# Patient Record
Sex: FEMALE | Race: BLACK OR AFRICAN AMERICAN | NOT HISPANIC OR LATINO | Employment: FULL TIME | URBAN - METROPOLITAN AREA
[De-identification: names, ages, dates, MRNs, and addresses within clinical notes are randomized per-mention and may not be internally consistent; named-entity substitution may affect disease eponyms.]

---

## 2018-08-31 ENCOUNTER — OFFICE VISIT (OUTPATIENT)
Dept: RHEUMATOLOGY | Facility: CLINIC | Age: 43
End: 2018-08-31
Payer: COMMERCIAL

## 2018-08-31 VITALS
DIASTOLIC BLOOD PRESSURE: 90 MMHG | BODY MASS INDEX: 39.4 KG/M2 | WEIGHT: 230.81 LBS | HEART RATE: 94 BPM | HEIGHT: 64 IN | SYSTOLIC BLOOD PRESSURE: 147 MMHG

## 2018-08-31 DIAGNOSIS — G89.29 ELBOW PAIN, CHRONIC, LEFT: Primary | ICD-10-CM

## 2018-08-31 DIAGNOSIS — M25.522 ELBOW PAIN, CHRONIC, LEFT: Primary | ICD-10-CM

## 2018-08-31 PROCEDURE — 99203 OFFICE O/P NEW LOW 30 MIN: CPT | Mod: S$GLB,,, | Performed by: INTERNAL MEDICINE

## 2018-08-31 PROCEDURE — 3008F BODY MASS INDEX DOCD: CPT | Mod: CPTII,S$GLB,, | Performed by: INTERNAL MEDICINE

## 2018-08-31 PROCEDURE — 99999 PR PBB SHADOW E&M-EST. PATIENT-LVL III: CPT | Mod: PBBFAC,,, | Performed by: INTERNAL MEDICINE

## 2018-08-31 RX ORDER — ATORVASTATIN CALCIUM 20 MG/1
20 TABLET, FILM COATED ORAL DAILY
COMMUNITY

## 2018-08-31 RX ORDER — LISINOPRIL 20 MG/1
20 TABLET ORAL DAILY
COMMUNITY

## 2018-08-31 RX ORDER — HYDROCHLOROTHIAZIDE 25 MG/1
25 TABLET ORAL DAILY
COMMUNITY

## 2018-08-31 NOTE — LETTER
August 31, 2018      [facility]  [address] [place] [address]  Phone: [phone]  Fax: [phone]       Patient: Deja Rg   YOB: 1975  Date of Visit: 08/31/2018    To Whom It May Concern:    Candelaria Rg  was at [facility] on 08/31/2018. She may return to work/school on 08/31/2018. If you have any questions or concerns, or if I can be of further assistance, please do not hesitate to contact me.        Sincerely,        Jovanni Reaves LPN

## 2018-08-31 NOTE — LETTER
August 31, 2018      Isauro Luevano MD  [address]  [address]  [place] [address]           [facility]  [address] [facility] [address]  Phone: [phone]  Fax: [phone]          Patient: Deja Rg   MR Number: [id number]   YOB: 1975   Date of Visit: 8/31/2018       Dear Dr. Isauro Luevano:    Thank you for referring Deja Rg to me for evaluation. Attached you will find relevant portions of my assessment and plan of care.    If you have questions, please do not hesitate to call me. I look forward to following Deja Rg along with you.    Sincerely,    Janes Lemos MD    Enclosure  CC:  No Recipients    If you would like to receive this communication electronically, please contact [email or web address] or [phone] to request more information on [employer] access.    For providers and/or their staff who would like to refer a patient to [facility], please contact us through our one-stop-shop provider referral line, [facility] , at [phone].    If you feel you have received this communication in error or would no longer like to receive these types of communications, please e-mail [email or web address]

## 2018-08-31 NOTE — PROGRESS NOTES
RHEUMATOLOGY OUTPATIENT CLINIC NOTE    8/31/2018    Attending Rheumatologist: Janes Lemos  Primary Care Provider: Mary Philip MD   Physician Requesting Consultation: Isauro Luveano MD  [address]  [address]  [place] [address]  Chief Complaint/Reason For Consultation:  Arthritis      Subjective:       HPI  Deja Rg is a 43 y.o. Black or  female with history of chronic left elbow pain,  status post trauma and ORIF complicated by osteomyelitis; comes here for evaluation.  Patient fell from a ladder 1/2017 which resulted and proximal ready had fracture.  Had ORIF done but pain was unbearable doing the recovery / therapy period.  Was recommended for surgical right intervention, however was noted to have osteomyelitis during the procedure.  Was treated for approximately 3 months.  Was followed by outside ID specialist, no evidence of persistent osteomyelitis noted.  Patient continues to complain having pain and swelling.  Following closely with Ortho, per patient was recommended for surgical repair.  On workup, patient is noted to have persistent elevation of increased acute phase reactants.    Today:  Patient main complaint is chronic left elbow pain.  Pain is constant, aggravated by activity and tender to touch.  Wakes up in the evening sometimes, alleviated by meloxicam which she uses on a regular basis.  Refers association with swelling.  She denies having any other significant joint pain, arthritis, prolonged morning stiffness.  Does not have any fever, rash, photosensitivity,  or GI complaints.    Review of Systems   Constitutional: Negative for fever and weight loss.   HENT:        Denies eye or mouth sicca symptoms, denies oral ulcers, denies parotid swelling, denies swollen glands.   Eyes: Negative for blurred vision, pain and redness.   Respiratory: Negative for cough, hemoptysis, sputum production and shortness of breath.    Cardiovascular: Negative for chest  pain, orthopnea, leg swelling and PND.   Gastrointestinal: Negative for abdominal pain, blood in stool, constipation, diarrhea and heartburn.        Denies dysphagia.   Genitourinary: Negative for dysuria and hematuria.        Denies genital ulcers or sicca symptoms, denies foaming of the urine, denies nephrolithiasis.   Musculoskeletal: Negative for back pain, joint pain, myalgias and neck pain.   Skin: Negative for rash.        Denies photosensitivity, denies alopecia, denies sclerodactyly, denies Raynaud's phenomenon,denies digital ulcers, no psoriasis, ulcerations, no purpura, denies nodules.   Neurological: Positive for tingling. Negative for sensory change, focal weakness, seizures, weakness and headaches.        Denies transient ischemic attack, denies strokes, denies seizures   Endo/Heme/Allergies: Does not bruise/bleed easily.        + ectopic pregnancy, no other fetal loss.  Denies frequent infections, denies deep vein thrombosis or pulmonary embolism.   Psychiatric/Behavioral: Negative for substance abuse.   All other systems reviewed and are negative.    Past Medical History:   Diagnosis Date    Diabetes mellitus 02/2013    insulin resistance     Thyroid condition      History reviewed. No pertinent surgical history.  Family History   Problem Relation Age of Onset    Diabetes Father     Cataracts Father      Social History     Substance and Sexual Activity   Alcohol Use Yes     Social History     Tobacco Use   Smoking Status Never Smoker     Social History     Substance and Sexual Activity   Drug Use Not on file       Current Outpatient Medications:     atorvastatin (LIPITOR) 20 MG tablet, Take 20 mg by mouth once daily., Disp: , Rfl:     hydroCHLOROthiazide (HYDRODIURIL) 25 MG tablet, Take 25 mg by mouth once daily., Disp: , Rfl:     lisinopril (PRINIVIL,ZESTRIL) 20 MG tablet, Take 20 mg by mouth once daily., Disp: , Rfl:        Objective:         Vitals:    08/31/18 1131   BP: (!) 147/90    Pulse: 94     Physical Exam   Nursing note and vitals reviewed.  Constitutional: She is oriented to person, place, and time and well-developed, well-nourished, and in no distress.   Morbid obese.   HENT:   Head: Normocephalic.   Eyes: Conjunctivae are normal.   Absent Episcleritis/scleritis.   Neck: Normal range of motion.   Cardiovascular: Normal rate.    Pulmonary/Chest: Effort normal. No respiratory distress.   Abdominal: She exhibits no distension.   Neurological: She is alert and oriented to person, place, and time.   absent sensory deficits  muscle strength 5/5 through.   + Tinnel's on left elbow.   Skin: No rash noted.     No Skin fibrosis, teleangiectasias, rashes, discoid lesions, purpura, skin ulcers, nodules, or livedo reticularis, nail pitting.   Psychiatric: Mood and affect normal.   Musculoskeletal: She exhibits edema (Left elbow.) and tenderness (Left elbow.  TTP left bicipital tendon.). She exhibits no deformity.   : strong    AROM: Slight decreased range of motion on extension 15% on left elbow, otherwise intact  PROM: intact    Devices used by patient: none       Reviewed old and all outside pertinent medical records available.    All lab results personally reviewed and interpreted by me.  Lab Results   Component Value Date    WBC 5.84 10/27/2006    HGB 9.4 (L) 10/27/2006    HCT 30.2 (L) 10/27/2006    MCV 82.5 10/27/2006    MCH 25.7 (L) 10/27/2006    MCHC 31.1 (L) 10/27/2006    RDW 15.3 (H) 10/27/2006     (H) 10/27/2006    MPV 10.2 10/27/2006       No results found for: NA, K, CL, CO2, GLU, BUN, LABCREA, CALCIUM, PROT, ALBUMIN, BILITOT, AST, ALKPHOS, ALT, GFRAA, GFRNONAA    No results found for: COLORU, APPEARANCEUA, SPECGRAV, PHUR, PROTEINUA, GLUCOSEU, KETONESU, BLOODU, LEUKOCYTESUR, NITRITE, UROBILINOGEN    No results found for: CRP    No results found for: SEDRATE, ERYTHROCYTES    No results found for: PARVEZ, RF, SEDRATE    No components found for: 25OHVITDTOT, 97ATFINR9, 46KZWVHF6,  METHODNOTE    No results found for: URICACID    No components found for: TSPOTTB    Rheum Labs:  PARVEZ negative  Rheumatoid factor negative   (Ref. 192)   ESR: 35  CRP: 15.5    Imaging:  All imaging reviewed and independently  interpreted by me.    X-ray lower spine  No acute abnormalities    X-ray thoracic spine  Slight scoliosis otherwise, no acute abnormalities.    X-ray left elbow  Arthritic changes.    MRI left elbow July 2018  Marked posttraumatic/infectious osteomyelitis sequela.  Small effusion present.  Epicondylitis noted.     ASSESSMENT / PLAN:     Deja Rg is a 43 y.o. Black or  female with:      1.  Chronic left elbow pain.  - status post traumatic event and ORIF, complicated by osteomyelitis.  - surgical repair recommended for surgical repair by outside Ortho per patient.  - HPI and current findings no consistent with autoimmune or inflammatory arthritis at this time.  - continue pain management per PMD.  Discussed clinical significance side effect of NSAID therapy.  - range of motion flexibility exercises as tolerated.    2.  Increased acute phase reactants.  - considering related to #1.  - no other rheumatic disease features that would jazzmine further workup or therapeutic intervention.  - will continue to monitor    3. Other specified counseling  - Weight loss counseling done  - exercise counseling done  - Medication counseling provided      Follow-up in about 6 months (around 2/28/2019).    Method of contact with patient concerns: MyChart attn Rheumatology      Janes Lemos M.D.  Rheumatology Department   [facility] [place] [address], [place] [address]  Phone: [phone]  Fax: [phone]

## 2019-03-05 ENCOUNTER — TELEPHONE (OUTPATIENT)
Dept: RHEUMATOLOGY | Facility: CLINIC | Age: 44
End: 2019-03-05

## 2019-03-05 NOTE — TELEPHONE ENCOUNTER
Called patient to reschedule canceled appointment. She will check work calendar and call back to reschedule.

## 2021-01-14 ENCOUNTER — OFFICE VISIT (OUTPATIENT)
Dept: OPHTHALMOLOGY | Facility: CLINIC | Age: 46
End: 2021-01-14
Payer: COMMERCIAL

## 2021-01-14 DIAGNOSIS — E11.9 DIABETES MELLITUS TYPE 2 WITHOUT RETINOPATHY: Primary | ICD-10-CM

## 2021-01-14 DIAGNOSIS — H52.7 REFRACTIVE ERRORS: ICD-10-CM

## 2021-01-14 DIAGNOSIS — Z46.0 ENCOUNTER FOR FITTING OR ADJUSTMENT OF SPECTACLES OR CONTACT LENSES: ICD-10-CM

## 2021-01-14 PROCEDURE — 92310 PR CONTACT LENS FITTING (NO CHANGE): ICD-10-PCS | Mod: CSM,,, | Performed by: OPTOMETRIST

## 2021-01-14 PROCEDURE — 92004 COMPRE OPH EXAM NEW PT 1/>: CPT | Mod: S$GLB,,, | Performed by: OPTOMETRIST

## 2021-01-14 PROCEDURE — 92015 DETERMINE REFRACTIVE STATE: CPT | Mod: S$GLB,,, | Performed by: OPTOMETRIST

## 2021-01-14 PROCEDURE — 99999 PR PBB SHADOW E&M-EST. PATIENT-LVL I: CPT | Mod: PBBFAC,,, | Performed by: OPTOMETRIST

## 2021-01-14 PROCEDURE — 92310 CONTACT LENS FITTING OU: CPT | Mod: CSM,,, | Performed by: OPTOMETRIST

## 2021-01-14 PROCEDURE — 92004 PR EYE EXAM, NEW PATIENT,COMPREHESV: ICD-10-PCS | Mod: S$GLB,,, | Performed by: OPTOMETRIST

## 2021-01-14 PROCEDURE — 99999 PR PBB SHADOW E&M-EST. PATIENT-LVL I: ICD-10-PCS | Mod: PBBFAC,,, | Performed by: OPTOMETRIST

## 2021-01-14 PROCEDURE — 92015 PR REFRACTION: ICD-10-PCS | Mod: S$GLB,,, | Performed by: OPTOMETRIST

## 2021-03-24 ENCOUNTER — TELEPHONE (OUTPATIENT)
Dept: OPHTHALMOLOGY | Facility: CLINIC | Age: 46
End: 2021-03-24

## 2022-03-16 ENCOUNTER — PATIENT MESSAGE (OUTPATIENT)
Dept: OPHTHALMOLOGY | Facility: CLINIC | Age: 47
End: 2022-03-16

## 2022-03-16 ENCOUNTER — OFFICE VISIT (OUTPATIENT)
Dept: OPHTHALMOLOGY | Facility: CLINIC | Age: 47
End: 2022-03-16
Payer: COMMERCIAL

## 2022-03-16 DIAGNOSIS — Z46.0 ENCOUNTER FOR FITTING OR ADJUSTMENT OF SPECTACLES OR CONTACT LENSES: ICD-10-CM

## 2022-03-16 DIAGNOSIS — E11.9 DIABETES MELLITUS TYPE 2 WITHOUT RETINOPATHY: Primary | ICD-10-CM

## 2022-03-16 DIAGNOSIS — H52.7 REFRACTIVE ERRORS: ICD-10-CM

## 2022-03-16 DIAGNOSIS — H10.13 ALLERGIC CONJUNCTIVITIS, BILATERAL: ICD-10-CM

## 2022-03-16 PROCEDURE — 92015 PR REFRACTION: ICD-10-PCS | Mod: S$GLB,,, | Performed by: OPTOMETRIST

## 2022-03-16 PROCEDURE — 92015 DETERMINE REFRACTIVE STATE: CPT | Mod: S$GLB,,, | Performed by: OPTOMETRIST

## 2022-03-16 PROCEDURE — 92310 PR CONTACT LENS FITTING (NO CHANGE): ICD-10-PCS | Mod: CSM,,, | Performed by: OPTOMETRIST

## 2022-03-16 PROCEDURE — 92014 COMPRE OPH EXAM EST PT 1/>: CPT | Mod: S$GLB,,, | Performed by: OPTOMETRIST

## 2022-03-16 PROCEDURE — 2023F DILAT RTA XM W/O RTNOPTHY: CPT | Mod: CPTII,S$GLB,, | Performed by: OPTOMETRIST

## 2022-03-16 PROCEDURE — 99999 PR PBB SHADOW E&M-EST. PATIENT-LVL I: CPT | Mod: PBBFAC,,, | Performed by: OPTOMETRIST

## 2022-03-16 PROCEDURE — 92014 PR EYE EXAM, EST PATIENT,COMPREHESV: ICD-10-PCS | Mod: S$GLB,,, | Performed by: OPTOMETRIST

## 2022-03-16 PROCEDURE — 92310 CONTACT LENS FITTING OU: CPT | Mod: CSM,,, | Performed by: OPTOMETRIST

## 2022-03-16 PROCEDURE — 2023F PR DILATED RETINAL EXAM W/O EVID OF RETINOPATHY: ICD-10-PCS | Mod: CPTII,S$GLB,, | Performed by: OPTOMETRIST

## 2022-03-16 PROCEDURE — 99999 PR PBB SHADOW E&M-EST. PATIENT-LVL I: ICD-10-PCS | Mod: PBBFAC,,, | Performed by: OPTOMETRIST

## 2022-03-16 PROCEDURE — 1159F PR MEDICATION LIST DOCUMENTED IN MEDICAL RECORD: ICD-10-PCS | Mod: CPTII,S$GLB,, | Performed by: OPTOMETRIST

## 2022-03-16 PROCEDURE — 1159F MED LIST DOCD IN RCRD: CPT | Mod: CPTII,S$GLB,, | Performed by: OPTOMETRIST

## 2022-03-16 RX ORDER — METFORMIN HYDROCHLORIDE 500 MG/1
500 TABLET ORAL 2 TIMES DAILY WITH MEALS
COMMUNITY

## 2022-03-16 NOTE — PROGRESS NOTES
HPI     Diabetic Eye Exam      Additional comments: Wants new contacts and glasses.    No results found for: LABA1C, HGBA1C  6.1                Comments     No complaints, vision unchanged          Last edited by Selina Callahan MA on 3/16/2022  4:05 PM. (History)            Assessment /Plan     For exam results, see Encounter Report.    Diabetes mellitus type 2 without retinopathy  No Background Diabetic Retinopathy    Encounter for fitting or adjustment of spectacles or contact lenses  May need to use daily disposables if allergy symptoms continue.    Allergic conjunctivitis, bilateral  Pataday qday with CL use.  Currently using Alaway    Refractive errors  Discussed CL charges.  Dispense Final Rx for glasses  No changes CL Rx, discussed possible change to daily disp  RTC 1 year  Discussed above and answered questions.

## 2023-05-16 ENCOUNTER — PATIENT MESSAGE (OUTPATIENT)
Dept: OPHTHALMOLOGY | Facility: CLINIC | Age: 48
End: 2023-05-16

## 2023-05-16 ENCOUNTER — OFFICE VISIT (OUTPATIENT)
Dept: OPHTHALMOLOGY | Facility: CLINIC | Age: 48
End: 2023-05-16
Payer: COMMERCIAL

## 2023-05-16 DIAGNOSIS — E11.9 DIABETES MELLITUS TYPE 2 WITHOUT RETINOPATHY: Primary | ICD-10-CM

## 2023-05-16 DIAGNOSIS — H52.7 REFRACTIVE ERRORS: ICD-10-CM

## 2023-05-16 DIAGNOSIS — Z46.0 ENCOUNTER FOR FITTING OR ADJUSTMENT OF SPECTACLES OR CONTACT LENSES: ICD-10-CM

## 2023-05-16 PROCEDURE — 92310 PR CONTACT LENS FITTING (NO CHANGE): ICD-10-PCS | Mod: CSM,S$GLB,, | Performed by: OPTOMETRIST

## 2023-05-16 PROCEDURE — 1159F PR MEDICATION LIST DOCUMENTED IN MEDICAL RECORD: ICD-10-PCS | Mod: CPTII,S$GLB,, | Performed by: OPTOMETRIST

## 2023-05-16 PROCEDURE — 92014 PR EYE EXAM, EST PATIENT,COMPREHESV: ICD-10-PCS | Mod: S$GLB,,, | Performed by: OPTOMETRIST

## 2023-05-16 PROCEDURE — 92014 COMPRE OPH EXAM EST PT 1/>: CPT | Mod: S$GLB,,, | Performed by: OPTOMETRIST

## 2023-05-16 PROCEDURE — 92015 PR REFRACTION: ICD-10-PCS | Mod: S$GLB,,, | Performed by: OPTOMETRIST

## 2023-05-16 PROCEDURE — 92310 CONTACT LENS FITTING OU: CPT | Mod: CSM,S$GLB,, | Performed by: OPTOMETRIST

## 2023-05-16 PROCEDURE — 4010F ACE/ARB THERAPY RXD/TAKEN: CPT | Mod: CPTII,S$GLB,, | Performed by: OPTOMETRIST

## 2023-05-16 PROCEDURE — 4010F PR ACE/ARB THEARPY RXD/TAKEN: ICD-10-PCS | Mod: CPTII,S$GLB,, | Performed by: OPTOMETRIST

## 2023-05-16 PROCEDURE — 99999 PR PBB SHADOW E&M-EST. PATIENT-LVL II: ICD-10-PCS | Mod: PBBFAC,,, | Performed by: OPTOMETRIST

## 2023-05-16 PROCEDURE — 1159F MED LIST DOCD IN RCRD: CPT | Mod: CPTII,S$GLB,, | Performed by: OPTOMETRIST

## 2023-05-16 PROCEDURE — 99999 PR PBB SHADOW E&M-EST. PATIENT-LVL II: CPT | Mod: PBBFAC,,, | Performed by: OPTOMETRIST

## 2023-05-16 PROCEDURE — 92015 DETERMINE REFRACTIVE STATE: CPT | Mod: S$GLB,,, | Performed by: OPTOMETRIST

## 2023-05-16 NOTE — PROGRESS NOTES
HPI     Diabetic Eye Exam     Additional comments: No results found for: LABA1C, HGBA1C                    Comments    Last visit with TRF on 03/16/2022.    Patient states seeing floaters more often now but no visual complaints.   Trouble with dry eyes lately.  Using otc allergy eyedrops.  Wear SVL glasses and contact lens.                 Last edited by Eden Cornejo on 5/16/2023  8:45 AM.            Assessment /Plan     For exam results, see Encounter Report.    Diabetes mellitus type 2 without retinopathy    Encounter for fitting or adjustment of spectacles or contact lenses    Refractive errors      No Background Diabetic Retinopathy  Strict BG control, f/u w/ PCP, and annual DFE  Stressed importance of DM control to preserve vision    Discussed CL charges.  Dispense Final Rx for glasses  No changes CL Rx  RTC 1 year  Discussed above and answered questions.

## 2023-08-14 ENCOUNTER — PATIENT MESSAGE (OUTPATIENT)
Dept: OPHTHALMOLOGY | Facility: CLINIC | Age: 48
End: 2023-08-14
Payer: COMMERCIAL

## 2023-08-14 ENCOUNTER — TELEPHONE (OUTPATIENT)
Dept: OPHTHALMOLOGY | Facility: CLINIC | Age: 48
End: 2023-08-14
Payer: COMMERCIAL

## 2023-08-14 NOTE — TELEPHONE ENCOUNTER
Spoke with at 11:26Am, advised her that Jada Pa will call her today to help her order ctl's .    SRS

## 2023-08-21 ENCOUNTER — OFFICE VISIT (OUTPATIENT)
Dept: OPHTHALMOLOGY | Facility: CLINIC | Age: 48
End: 2023-08-21
Payer: COMMERCIAL

## 2023-08-21 DIAGNOSIS — H01.01B ULCERATIVE BLEPHARITIS OF UPPER AND LOWER EYELIDS OF BOTH EYES: Primary | ICD-10-CM

## 2023-08-21 DIAGNOSIS — H01.01A ULCERATIVE BLEPHARITIS OF UPPER AND LOWER EYELIDS OF BOTH EYES: Primary | ICD-10-CM

## 2023-08-21 DIAGNOSIS — H04.123 DRY EYE SYNDROME, BILATERAL: ICD-10-CM

## 2023-08-21 PROCEDURE — 99999 PR PBB SHADOW E&M-EST. PATIENT-LVL II: ICD-10-PCS | Mod: PBBFAC,,, | Performed by: OPTOMETRIST

## 2023-08-21 PROCEDURE — 99999 PR PBB SHADOW E&M-EST. PATIENT-LVL II: CPT | Mod: PBBFAC,,, | Performed by: OPTOMETRIST

## 2023-08-21 PROCEDURE — 99213 OFFICE O/P EST LOW 20 MIN: CPT | Mod: S$GLB,,, | Performed by: OPTOMETRIST

## 2023-08-21 PROCEDURE — 4010F PR ACE/ARB THEARPY RXD/TAKEN: ICD-10-PCS | Mod: CPTII,S$GLB,, | Performed by: OPTOMETRIST

## 2023-08-21 PROCEDURE — 1159F PR MEDICATION LIST DOCUMENTED IN MEDICAL RECORD: ICD-10-PCS | Mod: CPTII,S$GLB,, | Performed by: OPTOMETRIST

## 2023-08-21 PROCEDURE — 1159F MED LIST DOCD IN RCRD: CPT | Mod: CPTII,S$GLB,, | Performed by: OPTOMETRIST

## 2023-08-21 PROCEDURE — 4010F ACE/ARB THERAPY RXD/TAKEN: CPT | Mod: CPTII,S$GLB,, | Performed by: OPTOMETRIST

## 2023-08-21 PROCEDURE — 99213 PR OFFICE/OUTPT VISIT, EST, LEVL III, 20-29 MIN: ICD-10-PCS | Mod: S$GLB,,, | Performed by: OPTOMETRIST

## 2023-08-21 RX ORDER — NEOMYCIN SULFATE, POLYMYXIN B SULFATE, AND DEXAMETHASONE 3.5; 10000; 1 MG/G; [USP'U]/G; MG/G
OINTMENT OPHTHALMIC
Qty: 3.5 G | Refills: 0 | Status: SHIPPED | OUTPATIENT
Start: 2023-08-21 | End: 2023-08-28

## 2023-08-21 NOTE — PROGRESS NOTES
HPI     Dry Eye            Comments: NP to DKT  Patient here today for eye irritation with dryness and crusting             Comments    Pain Scale:  0-1  Onset:   2 weeks  OD, OS, OU:   OU  Discharge:   Yes  A.M. Matting:  Yes  Itch:   Yes  Redness:   No  Photophobia:   No  Foreign body sensation:   No  Deep pain:   No  Previous occurrence:   No  Drops:   Pataday    1. DM              Last edited by Sabra Manrique, PCT on 8/21/2023  3:42 PM.            Assessment /Plan     For exam results, see Encounter Report.    Ulcerative blepharitis of upper and lower eyelids of both eyes  Exam findings are consistent with moderateblepharitis. Recommended patient begin Maxitrol ointment BID OU for 7 days.     Dry eye syndrome, bilateral  There were signs of mild dry eye on today's examination. Discussed warm compresses with lid hygiene twice daily. Recommend preservative-free artificial tears 3-4 times daily. Patient to return to clinic if no improvement in symptoms with current treatment.      RTC as scheduled for annual eye exam, sooner if any changes to vision worsening symptoms.

## 2024-08-27 ENCOUNTER — OFFICE VISIT (OUTPATIENT)
Dept: OPHTHALMOLOGY | Facility: CLINIC | Age: 49
End: 2024-08-27
Payer: COMMERCIAL

## 2024-08-27 DIAGNOSIS — E11.9 DIABETES MELLITUS TYPE 2 WITHOUT RETINOPATHY: Primary | ICD-10-CM

## 2024-08-27 DIAGNOSIS — H52.7 REFRACTIVE ERRORS: ICD-10-CM

## 2024-08-27 DIAGNOSIS — Z46.0 ENCOUNTER FOR FITTING OR ADJUSTMENT OF SPECTACLES OR CONTACT LENSES: ICD-10-CM

## 2024-08-27 PROCEDURE — 99999 PR PBB SHADOW E&M-EST. PATIENT-LVL III: CPT | Mod: PBBFAC,,, | Performed by: OPTOMETRIST

## 2024-08-27 PROCEDURE — 92310 CONTACT LENS FITTING OU: CPT | Mod: CSM,,, | Performed by: OPTOMETRIST

## 2024-08-27 RX ORDER — FLUTICASONE PROPIONATE 50 MCG
2 SPRAY, SUSPENSION (ML) NASAL DAILY
COMMUNITY

## 2024-08-27 RX ORDER — ZOLPIDEM TARTRATE 10 MG/1
1 TABLET ORAL NIGHTLY PRN
COMMUNITY
Start: 2024-04-10

## 2024-08-27 RX ORDER — CETIRIZINE HYDROCHLORIDE 10 MG/1
10 TABLET ORAL
COMMUNITY

## 2024-08-27 RX ORDER — DICLOFENAC SODIUM 50 MG/1
TABLET, DELAYED RELEASE ORAL
COMMUNITY

## 2024-08-27 RX ORDER — MONTELUKAST SODIUM 10 MG/1
10 TABLET ORAL
COMMUNITY

## 2024-08-27 RX ORDER — SEMAGLUTIDE 2.68 MG/ML
2 INJECTION, SOLUTION SUBCUTANEOUS
COMMUNITY

## 2024-08-27 NOTE — PROGRESS NOTES
"SUBJECTIVE  Deja Rg is 49 y.o. female  Corrected distance visual acuity was 20/20 in the right eye and 20/25 in the left eye. Corrected near visual acuity was J1 in the right eye and J1 in the left eye.   Chief Complaint   Patient presents with    Diabetic Eye Exam     No results found for: "LABA1C", "HGBA1C"          HPI     Diabetic Eye Exam     Additional comments: No results found for: "LABA1C", "HGBA1C"           Comments    Patient states trouble with focusing and rubbing eyes more, while wear   contact lens.   Trouble with itchy and dry eyes.  Using Pataday prn.  Wear glasses and contact lens. Would like updated rx for both today.           Last edited by Eden Cornejo on 8/27/2024  9:00 AM.         Assessment /Plan :  1. Diabetes mellitus type 2 without retinopathy  No Background Diabetic Retinopathy  Strict BG control, f/u w/ PCP, and annual DFE  Stressed importance of DM control to preserve vision     2. Refractive errors  Dispense Final Rx for glasses.  RTC 1 year  Discussed above and answered questions.      3. Encounter for fitting or adjustment of spectacles or contact lenses  No changes CL Rx  Dispense Final Rx for contacts.                    "

## 2024-08-27 NOTE — LETTER
August 27, 2024    Deja Rg  [address]  [place] [address]             [facility]  Ophthalmology  [address] [place] [address] [address] [place] [address]  Phone: [phone]  Fax: [phone]   August 27, 2024     Patient: Deja Rg   YOB: 1975   Date of Visit: 8/27/2024       To Whom it May Concern:    Deja Rg was seen in my clinic on 8/27/2024. She may return to school on today  and may return to work on today .    Please excuse her from any classes or work missed.    If you have any questions or concerns, please don't hesitate to call.    Sincerely,          Mac Reed, OD